# Patient Record
Sex: MALE | Race: WHITE | NOT HISPANIC OR LATINO | Employment: FULL TIME | URBAN - METROPOLITAN AREA
[De-identification: names, ages, dates, MRNs, and addresses within clinical notes are randomized per-mention and may not be internally consistent; named-entity substitution may affect disease eponyms.]

---

## 2017-04-18 ENCOUNTER — ALLSCRIPTS OFFICE VISIT (OUTPATIENT)
Dept: OTHER | Facility: OTHER | Age: 22
End: 2017-04-18

## 2018-01-13 VITALS
SYSTOLIC BLOOD PRESSURE: 133 MMHG | HEART RATE: 73 BPM | TEMPERATURE: 99.7 F | OXYGEN SATURATION: 99 % | RESPIRATION RATE: 18 BRPM | DIASTOLIC BLOOD PRESSURE: 72 MMHG

## 2022-08-01 ENCOUNTER — OFFICE VISIT (OUTPATIENT)
Dept: URGENT CARE | Facility: CLINIC | Age: 27
End: 2022-08-01
Payer: COMMERCIAL

## 2022-08-01 VITALS
SYSTOLIC BLOOD PRESSURE: 153 MMHG | DIASTOLIC BLOOD PRESSURE: 94 MMHG | OXYGEN SATURATION: 99 % | TEMPERATURE: 99 F | HEART RATE: 78 BPM

## 2022-08-01 DIAGNOSIS — E73.9 LACTOSE INTOLERANCE: ICD-10-CM

## 2022-08-01 DIAGNOSIS — K90.9 DIARRHEA DUE TO MALABSORPTION: Primary | ICD-10-CM

## 2022-08-01 DIAGNOSIS — R19.7 DIARRHEA DUE TO MALABSORPTION: Primary | ICD-10-CM

## 2022-08-01 PROCEDURE — 99213 OFFICE O/P EST LOW 20 MIN: CPT | Performed by: FAMILY MEDICINE

## 2022-08-01 NOTE — PROGRESS NOTES
[facility]    NAME: Simone Potter is a 28 y o  male  : 1995    MRN: [id number]  DATE: 2022  TIME: 4:07 PM    Assessment and Plan   Diarrhea due to malabsorption [K90 9, R19 7]  1  Diarrhea due to malabsorption     2  Lactose intolerance       Unremarkable physical examination   Possible differentials include IBS versus lactose intolerant  Advised patient to keep a food diary  Advised patient to avoid any dairy products for the next 3-4 weeks until establishing with a provider  Provided the patient with precautionary measures    Patient Instructions   There are no Patient Instructions on file for this visit  Follow up with PCP in 3-5 days  Proceed to ER if symptoms worsen  Chief Complaint     Chief Complaint   Patient presents with    Abdominal Pain     History of Present Illness   Abdominal Pain  This is a new problem  The current episode started 1 to 4 weeks ago  The onset quality is gradual  The problem occurs daily  The problem has been unchanged  Pain location: None  The pain is at a severity of 0/10  The patient is experiencing no pain  The abdominal pain does not radiate  Associated symptoms include diarrhea  Pertinent negatives include no constipation, frequency, hematochezia, nausea or vomiting  The pain is aggravated by eating (Food)  The pain is relieved by bowel movements  He has tried nothing for the symptoms  The treatment provided no relief  There is no history of Crohn's disease or irritable bowel syndrome  Review of Systems   Review of Systems   Gastrointestinal: Positive for abdominal pain and diarrhea  Negative for constipation, hematochezia, nausea and vomiting  Genitourinary: Negative for frequency  All other systems reviewed and are negative      The following portions of the patient's history were reviewed and updated as appropriate: allergies, current medications, past family history, past medical history, past social history, past surgical history and problem list      Medications have been verified  Objective   /94 (BP Location: Right arm, Patient Position: Sitting, Cuff Size: Standard)   Pulse 78   Temp 99 °F (37 2 °C)   SpO2 99%     Physical Exam  Vitals reviewed  Constitutional:       General: He is not in acute distress  Appearance: He is well-developed  He is not diaphoretic  HENT:      Head: Normocephalic and atraumatic  Nose: Nose normal    Eyes:      Conjunctiva/sclera: Conjunctivae normal       Pupils: Pupils are equal, round, and reactive to light  Cardiovascular:      Rate and Rhythm: Normal rate and regular rhythm  Heart sounds: Normal heart sounds  No murmur heard  No friction rub  No gallop  Pulmonary:      Effort: Pulmonary effort is normal  No respiratory distress  Breath sounds: Normal breath sounds  No wheezing or rales  Abdominal:      General: Bowel sounds are normal  There is no distension  Palpations: Abdomen is soft  Tenderness: There is no abdominal tenderness  There is no right CVA tenderness, left CVA tenderness, guarding or rebound  Negative signs include Zheng's sign  Hernia: No hernia is present  Musculoskeletal:         General: Normal range of motion  Cervical back: Normal range of motion and neck supple  Skin:     General: Skin is warm and dry  Findings: No erythema or rash  Neurological:      General: No focal deficit present  Mental Status: He is alert and oriented to person, place, and time         Juana Caputo MD